# Patient Record
Sex: FEMALE | Race: WHITE | NOT HISPANIC OR LATINO | Employment: UNEMPLOYED | URBAN - NONMETROPOLITAN AREA
[De-identification: names, ages, dates, MRNs, and addresses within clinical notes are randomized per-mention and may not be internally consistent; named-entity substitution may affect disease eponyms.]

---

## 2023-04-06 PROBLEM — R53.1 DECREASED STRENGTH: Status: ACTIVE | Noted: 2023-04-06

## 2023-04-06 PROBLEM — S52.602D CLOSED FRACTURE DISTAL RADIUS AND ULNA, LEFT, WITH ROUTINE HEALING, SUBSEQUENT ENCOUNTER: Status: ACTIVE | Noted: 2023-04-06

## 2023-04-06 PROBLEM — S52.502D CLOSED FRACTURE DISTAL RADIUS AND ULNA, LEFT, WITH ROUTINE HEALING, SUBSEQUENT ENCOUNTER: Status: ACTIVE | Noted: 2023-04-06

## 2023-04-06 PROBLEM — M25.632 DECREASED RANGE OF MOTION OF LEFT WRIST: Status: ACTIVE | Noted: 2023-04-06

## 2023-04-06 RX ORDER — IBUPROFEN 800 MG/1
1 TABLET ORAL 3 TIMES DAILY PRN
COMMUNITY
Start: 2022-07-19 | End: 2023-10-02 | Stop reason: ALTCHOICE

## 2023-04-07 ENCOUNTER — TELEPHONE (OUTPATIENT)
Dept: PRIMARY CARE | Facility: CLINIC | Age: 63
End: 2023-04-07

## 2023-04-07 ENCOUNTER — OFFICE VISIT (OUTPATIENT)
Dept: PRIMARY CARE | Facility: CLINIC | Age: 63
End: 2023-04-07
Payer: COMMERCIAL

## 2023-04-07 VITALS
DIASTOLIC BLOOD PRESSURE: 88 MMHG | WEIGHT: 174.9 LBS | HEIGHT: 64 IN | BODY MASS INDEX: 29.86 KG/M2 | HEART RATE: 79 BPM | OXYGEN SATURATION: 97 % | SYSTOLIC BLOOD PRESSURE: 140 MMHG

## 2023-04-07 DIAGNOSIS — Z13.1 SCREENING FOR DIABETES MELLITUS: ICD-10-CM

## 2023-04-07 DIAGNOSIS — Z12.31 BREAST CANCER SCREENING BY MAMMOGRAM: ICD-10-CM

## 2023-04-07 DIAGNOSIS — Z12.11 SCREENING FOR COLON CANCER: Primary | ICD-10-CM

## 2023-04-07 DIAGNOSIS — L71.9 ROSACEA: ICD-10-CM

## 2023-04-07 DIAGNOSIS — Z13.220 ENCOUNTER FOR SCREENING FOR LIPID DISORDER: ICD-10-CM

## 2023-04-07 PROCEDURE — 99386 PREV VISIT NEW AGE 40-64: CPT | Performed by: FAMILY MEDICINE

## 2023-04-07 PROCEDURE — 1036F TOBACCO NON-USER: CPT | Performed by: FAMILY MEDICINE

## 2023-04-07 ASSESSMENT — ENCOUNTER SYMPTOMS
VOMITING: 0
PALPITATIONS: 0
FATIGUE: 0
ARTHRALGIAS: 0
COUGH: 0
WHEEZING: 0
TROUBLE SWALLOWING: 0
CONSTIPATION: 0
RHINORRHEA: 0
NUMBNESS: 0
UNEXPECTED WEIGHT CHANGE: 0
DIARRHEA: 0
ADENOPATHY: 0
ABDOMINAL DISTENTION: 0
ABDOMINAL PAIN: 0
SHORTNESS OF BREATH: 0
NAUSEA: 0
ACTIVITY CHANGE: 0
APPETITE CHANGE: 0
HEADACHES: 0
LIGHT-HEADEDNESS: 0
DIFFICULTY URINATING: 0
DIZZINESS: 0

## 2023-04-07 NOTE — PROGRESS NOTES
"Subjective   Patient ID: aCt Saunders is a 62 y.o. female who presents for NP, ESTAB CARE.    HPI   No MMG in years, no pap in years, no colon cancer screening in the past (sister and grandfather with CA)  No bowel issues, had fracture last year the left wrist after fall  Tries to stay active, no tobacco use  No headache, chest pain, shortness of breath, dizziness, lightheadedness, or edema  No skin changes, no N/T, some weakness in L hand at times    Review of Systems   Constitutional:  Negative for activity change, appetite change, fatigue and unexpected weight change.   HENT:  Negative for ear pain, nosebleeds, rhinorrhea, sneezing and trouble swallowing.    Respiratory:  Negative for cough, shortness of breath and wheezing.    Cardiovascular:  Negative for chest pain, palpitations and leg swelling.   Gastrointestinal:  Negative for abdominal distention, abdominal pain, constipation, diarrhea, nausea and vomiting.   Genitourinary:  Negative for difficulty urinating.   Musculoskeletal:  Negative for arthralgias.   Skin:  Negative for rash.   Neurological:  Negative for dizziness, light-headedness, numbness and headaches.   Hematological:  Negative for adenopathy.   Psychiatric/Behavioral:  Negative for behavioral problems.    All other systems reviewed and are negative.      Objective   /88   Pulse 79   Ht 1.613 m (5' 3.5\")   Wt 79.3 kg (174 lb 14.4 oz)   SpO2 97%   BMI 30.50 kg/m²     Physical Exam  Vitals and nursing note reviewed.   Constitutional:       General: She is not in acute distress.     Appearance: Normal appearance. She is not toxic-appearing.   HENT:      Head: Normocephalic and atraumatic.      Right Ear: Tympanic membrane, ear canal and external ear normal.      Left Ear: Tympanic membrane, ear canal and external ear normal.      Nose: Nose normal.      Mouth/Throat:      Mouth: Mucous membranes are dry.      Pharynx: Oropharynx is clear.   Eyes:      Extraocular Movements: Extraocular " movements intact.      Conjunctiva/sclera: Conjunctivae normal.      Pupils: Pupils are equal, round, and reactive to light.   Cardiovascular:      Rate and Rhythm: Normal rate and regular rhythm.   Pulmonary:      Effort: Pulmonary effort is normal.      Breath sounds: Normal breath sounds.   Abdominal:      General: Abdomen is flat. Bowel sounds are normal.      Palpations: Abdomen is soft.   Musculoskeletal:      Cervical back: Normal range of motion and neck supple.   Skin:     General: Skin is warm and dry.      Capillary Refill: Capillary refill takes less than 2 seconds.   Neurological:      General: No focal deficit present.      Mental Status: She is alert and oriented to person, place, and time. Mental status is at baseline.   Psychiatric:         Mood and Affect: Mood normal.         Behavior: Behavior normal.         Assessment/Plan   Problem List Items Addressed This Visit    None  Visit Diagnoses       Screening for colon cancer    -  Primary    Arrange for screening colonoscopy.    Relevant Orders    Colonoscopy    Breast cancer screening by mammogram        Arrange for screening mammogram    Relevant Orders    BI mammo bilateral screening tomosynthesis    Screening for diabetes mellitus        Get blood testing advised about diet.    Relevant Orders    Comprehensive Metabolic Panel    Encounter for screening for lipid disorder        We will get blood testing.    Relevant Orders    Lipid Panel    Rosacea            Patient was advised about recommendations for diet and exercise, get home blood pressure readings with goal to be less than 140/90.  Patient was recommended to get a Pap smear, will schedule for colonoscopy and mammogram, get fasting blood test, was advised about treatment options for rosacea, follow-up as needed.

## 2023-04-07 NOTE — PATIENT INSTRUCTIONS
Follow-up as needed, check home blood pressures, goal to be less than 140/90, be active, limit caffeine, alcohol and salt

## 2023-04-07 NOTE — TELEPHONE ENCOUNTER
OPEN ACCESS COLONOSCOPY REQUEST, ORDER, INSURANCE, AND DEMOGRAPHICS FAXED TO [facility]/DR. RANDOLPH GÓMEZ/ REQUEST TO SCHEDULE PATIENT.  PATIENT STATES HE HAS NEVER HAD A COLONOSCOPY, HOWEVER BOTH SISTER AND GRANDFATHER HAD COLON CANCER.

## 2023-04-11 ENCOUNTER — TELEPHONE (OUTPATIENT)
Dept: PRIMARY CARE | Facility: CLINIC | Age: 63
End: 2023-04-11
Payer: COMMERCIAL

## 2023-04-11 NOTE — TELEPHONE ENCOUNTER
PATIENT CALLED TO REQUEST PAP AND MAMMOGRAM BE DONE AT [facility]. SHE WILL CALL TO SCHEDULE HER APPOINTMENT. SHE WILL HAVE OB/GYN SCHEDULE MAMMOGRAM.

## 2023-09-22 ENCOUNTER — DOCUMENTATION (OUTPATIENT)
Dept: SURGERY | Facility: HOSPITAL | Age: 63
End: 2023-09-22
Payer: COMMERCIAL

## 2023-09-22 ENCOUNTER — PREP FOR PROCEDURE (OUTPATIENT)
Dept: SURGERY | Facility: HOSPITAL | Age: 63
End: 2023-09-22
Payer: COMMERCIAL

## 2023-10-01 RX ORDER — SODIUM CHLORIDE 9 MG/ML
100 INJECTION, SOLUTION INTRAVENOUS CONTINUOUS
Status: DISCONTINUED | OUTPATIENT
Start: 2023-10-02 | End: 2023-10-20 | Stop reason: HOSPADM

## 2023-10-02 ENCOUNTER — HOSPITAL ENCOUNTER (OUTPATIENT)
Dept: GASTROENTEROLOGY | Facility: CLINIC | Age: 63
Setting detail: OUTPATIENT SURGERY
Discharge: HOME | End: 2023-10-02
Payer: COMMERCIAL

## 2023-10-02 VITALS
TEMPERATURE: 97.3 F | SYSTOLIC BLOOD PRESSURE: 143 MMHG | BODY MASS INDEX: 29.81 KG/M2 | DIASTOLIC BLOOD PRESSURE: 88 MMHG | OXYGEN SATURATION: 97 % | RESPIRATION RATE: 16 BRPM | HEIGHT: 64 IN | HEART RATE: 57 BPM | WEIGHT: 174.6 LBS

## 2023-10-02 DIAGNOSIS — Z12.11 ENCOUNTER FOR SCREENING FOR MALIGNANT NEOPLASM OF COLON: Primary | ICD-10-CM

## 2023-10-02 DIAGNOSIS — Z80.0 FAMILY HISTORY OF MALIGNANT NEOPLASM OF DIGESTIVE ORGANS: ICD-10-CM

## 2023-10-02 PROCEDURE — 7100000010 HC PHASE TWO TIME - EACH INCREMENTAL 1 MINUTE

## 2023-10-02 PROCEDURE — 88305 TISSUE EXAM BY PATHOLOGIST: CPT | Performed by: PATHOLOGY

## 2023-10-02 PROCEDURE — 88305 TISSUE EXAM BY PATHOLOGIST: CPT | Mod: TC | Performed by: SURGERY

## 2023-10-02 PROCEDURE — 99153 MOD SED SAME PHYS/QHP EA: CPT | Performed by: SURGERY

## 2023-10-02 PROCEDURE — 2500000004 HC RX 250 GENERAL PHARMACY W/ HCPCS (ALT 636 FOR OP/ED): Mod: JZ | Performed by: SURGERY

## 2023-10-02 PROCEDURE — 45380 COLONOSCOPY AND BIOPSY: CPT | Performed by: SURGERY

## 2023-10-02 PROCEDURE — 88305 TISSUE EXAM BY PATHOLOGIST: CPT | Mod: TC,SUR | Performed by: SURGERY

## 2023-10-02 PROCEDURE — 99152 MOD SED SAME PHYS/QHP 5/>YRS: CPT | Performed by: SURGERY

## 2023-10-02 PROCEDURE — 3700000013 HC SEDATION LEVEL 5+ TIME - EACH ADDITIONAL 15 MINUTES

## 2023-10-02 PROCEDURE — 3700000012 HC SEDATION LEVEL 5+ TIME - INITIAL 15 MINUTES 5/> YEARS

## 2023-10-02 PROCEDURE — 7100000009 HC PHASE TWO TIME - INITIAL BASE CHARGE

## 2023-10-02 RX ORDER — MIDAZOLAM HYDROCHLORIDE 5 MG/ML
INJECTION, SOLUTION INTRAMUSCULAR; INTRAVENOUS AS NEEDED
Status: COMPLETED | OUTPATIENT
Start: 2023-10-02 | End: 2023-10-02

## 2023-10-02 RX ORDER — CYANOCOBALAMIN (VITAMIN B-12) 250 MCG
250 TABLET ORAL DAILY
COMMUNITY

## 2023-10-02 RX ORDER — FENTANYL CITRATE 50 UG/ML
INJECTION, SOLUTION INTRAMUSCULAR; INTRAVENOUS AS NEEDED
Status: COMPLETED | OUTPATIENT
Start: 2023-10-02 | End: 2023-10-02

## 2023-10-02 RX ORDER — CALCIUM CARBONATE 500(1250)
1 TABLET ORAL DAILY
COMMUNITY

## 2023-10-02 RX ORDER — ONDANSETRON HYDROCHLORIDE 2 MG/ML
4 INJECTION, SOLUTION INTRAVENOUS ONCE AS NEEDED
Status: DISCONTINUED | OUTPATIENT
Start: 2023-10-02 | End: 2023-10-20 | Stop reason: HOSPADM

## 2023-10-02 RX ADMIN — MIDAZOLAM HYDROCHLORIDE 4 MG: 5 INJECTION, SOLUTION INTRAMUSCULAR; INTRAVENOUS at 07:55

## 2023-10-02 RX ADMIN — FENTANYL CITRATE 50 MCG: 50 INJECTION, SOLUTION INTRAMUSCULAR; INTRAVENOUS at 07:54

## 2023-10-02 RX ADMIN — SODIUM CHLORIDE 100 ML/HR: 9 INJECTION, SOLUTION INTRAVENOUS at 07:00

## 2023-10-02 RX ADMIN — GLUCAGON HYDROCHLORIDE 1 MG: KIT at 07:54

## 2023-10-02 ASSESSMENT — PAIN - FUNCTIONAL ASSESSMENT
PAIN_FUNCTIONAL_ASSESSMENT: 0-10

## 2023-10-02 ASSESSMENT — PAIN SCALES - GENERAL
PAINLEVEL_OUTOF10: 0 - NO PAIN

## 2023-10-02 ASSESSMENT — COLUMBIA-SUICIDE SEVERITY RATING SCALE - C-SSRS
6. HAVE YOU EVER DONE ANYTHING, STARTED TO DO ANYTHING, OR PREPARED TO DO ANYTHING TO END YOUR LIFE?: NO
1. IN THE PAST MONTH, HAVE YOU WISHED YOU WERE DEAD OR WISHED YOU COULD GO TO SLEEP AND NOT WAKE UP?: NO
2. HAVE YOU ACTUALLY HAD ANY THOUGHTS OF KILLING YOURSELF?: NO

## 2023-10-02 NOTE — H&P
General Surgery H&P    Patient: Cat Saunders  : 1960  MRN: [id number]  Date: 10/02/23    Referring Primary Care Provider: Oscar Lee MD    Chief Complaint: Colon cancer screening    History of Present Illness: Cat Saunders is a 63 y.o. old female seen at the request of Dr. Lee for colon cancer screening.  She has never had a colonoscopy.  Her sister had colon cancer, diagnosed in her early 80s.  She also had a grandparent with colon cancer diagnosed at an advanced age.  She has bowel movements on average once daily.  She has occasionally seen bright red blood with bowel movements when constipated and straining.  She takes stool softeners as needed, but not on a regular basis.  She is not on any blood thinners or antiplatelet agents.    Medical History:  BMI 30, otherwise no known medical issues.    Surgical History:  Surgery for a wrist fracture 2022    Home Medications:  Prior to Admission medications    Medication Sig Start Date End Date Taking? Authorizing Provider   ibuprofen 800 mg tablet Take 1 tablet (800 mg) by mouth 3 times a day as needed. 22   Historical Provider, MD     Allergies:  No Known Allergies    Family History:   Sister and paternal grandmother with history of colon cancer, both diagnosed at advanced age.    Social History:  Non-smoker    ROS:  Constitutional:  no fever, sweats, and chills  Cardiovascular: No chest pain  Respiratory: No cough or shortness of breath  Gastrointestinal: No abdominal pain, + blood in the stool if straining/constipated, no change in bowel habits  Genitourinary: no dysuria  Musculoskeletal: no weakness or swelling  Integumentary: no rashes  Neurological: no confusion  Endocrine: no heat or cold intolerance  Heme/Lymph: no easy bruising or bleeding    Physical Exam:  Constitutional: No acute distress, conversant, pleasant  Neurologic: alert and oriented  Psych: appropriate affect  Ears, Nose, Mouth and Throat: mucus membranes moist  Pulmonary: No  labored breathing  Cardiovascular: Regular rate and rhythm  Abdomen: non-distended, BMI 30  Musculoskeletal: Moves all extremities  Skin: warm and dry, no jaundice    Labs/imaging:  No pertinent labs or imaging available for review    Assessment and Plan: Cat Saunders is a 63 y.o. old female with family history of colon cancer, in need of colon cancer screening.  We discussed risks and benefit of colonoscopy.  This included risk of bleeding, perforation, missed polyps, incomplete colonoscopy, and potential need for additional procedures pending findings.  She was agreeable to proceed.    Priscila Perez MD  10/2/2023

## 2023-10-06 ENCOUNTER — DOCUMENTATION (OUTPATIENT)
Dept: SURGERY | Facility: CLINIC | Age: 63
End: 2023-10-06
Payer: COMMERCIAL

## 2023-10-06 ENCOUNTER — TELEPHONE (OUTPATIENT)
Dept: SURGERY | Facility: HOSPITAL | Age: 63
End: 2023-10-06
Payer: COMMERCIAL

## 2023-10-06 LAB
LABORATORY COMMENT REPORT: NORMAL
PATH REPORT.FINAL DX SPEC: NORMAL
PATH REPORT.GROSS SPEC: NORMAL
PATH REPORT.TOTAL CANCER: NORMAL

## 2023-10-06 NOTE — PROGRESS NOTES
Spoke to patient to see how they were doing after colonoscopy.  Pt denied fever, chills, nausea, and vomiting. Pt needs to repeat colonoscopy in 5 years. Dr. Perez will call patient in 2-3 weeks with pathology.

## 2023-10-06 NOTE — TELEPHONE ENCOUNTER
I called the patient to discuss pathology results from recent colonoscopy.  There was no answer, but voicemail was left with callback number provided.  The polyp was a 4 mm tubular adenoma.  This is benign.  Given family history, recommend repeat colonoscopy in 5 years.    Priscila Perez MD  10/06/23  3:18 PM

## 2024-04-08 ENCOUNTER — APPOINTMENT (OUTPATIENT)
Dept: PRIMARY CARE | Facility: CLINIC | Age: 64
End: 2024-04-08
Payer: COMMERCIAL

## 2024-06-10 ENCOUNTER — APPOINTMENT (OUTPATIENT)
Dept: PRIMARY CARE | Facility: CLINIC | Age: 64
End: 2024-06-10
Payer: COMMERCIAL

## 2024-06-12 ENCOUNTER — APPOINTMENT (OUTPATIENT)
Dept: PRIMARY CARE | Facility: CLINIC | Age: 64
End: 2024-06-12
Payer: COMMERCIAL

## 2025-02-04 ENCOUNTER — HOSPITAL ENCOUNTER (OUTPATIENT)
Dept: RADIOLOGY | Facility: HOSPITAL | Age: 65
Discharge: HOME | End: 2025-02-04
Payer: COMMERCIAL

## 2025-02-04 ENCOUNTER — OFFICE VISIT (OUTPATIENT)
Age: 65
End: 2025-02-04
Payer: COMMERCIAL

## 2025-02-04 VITALS
DIASTOLIC BLOOD PRESSURE: 80 MMHG | SYSTOLIC BLOOD PRESSURE: 140 MMHG | HEART RATE: 68 BPM | OXYGEN SATURATION: 97 % | HEIGHT: 64 IN | BODY MASS INDEX: 28.85 KG/M2 | WEIGHT: 169 LBS

## 2025-02-04 DIAGNOSIS — M25.531 RIGHT WRIST PAIN: Primary | ICD-10-CM

## 2025-02-04 DIAGNOSIS — M25.531 RIGHT WRIST PAIN: ICD-10-CM

## 2025-02-04 PROCEDURE — 73100 X-RAY EXAM OF WRIST: CPT | Mod: RIGHT SIDE | Performed by: RADIOLOGY

## 2025-02-04 PROCEDURE — 73100 X-RAY EXAM OF WRIST: CPT | Mod: RT

## 2025-02-04 PROCEDURE — 99213 OFFICE O/P EST LOW 20 MIN: CPT | Performed by: FAMILY MEDICINE

## 2025-02-04 PROCEDURE — 3008F BODY MASS INDEX DOCD: CPT | Performed by: FAMILY MEDICINE

## 2025-02-04 PROCEDURE — 1036F TOBACCO NON-USER: CPT | Performed by: FAMILY MEDICINE

## 2025-02-04 ASSESSMENT — ENCOUNTER SYMPTOMS
NUMBNESS: 0
APPETITE CHANGE: 0
LOSS OF SENSATION IN FEET: 0
ACTIVITY CHANGE: 0
OCCASIONAL FEELINGS OF UNSTEADINESS: 0
ARTHRALGIAS: 1
WEAKNESS: 0
JOINT SWELLING: 1
DEPRESSION: 0

## 2025-02-04 ASSESSMENT — PATIENT HEALTH QUESTIONNAIRE - PHQ9
SUM OF ALL RESPONSES TO PHQ9 QUESTIONS 1 AND 2: 0
1. LITTLE INTEREST OR PLEASURE IN DOING THINGS: NOT AT ALL
2. FEELING DOWN, DEPRESSED OR HOPELESS: NOT AT ALL

## 2025-02-04 NOTE — PROGRESS NOTES
"Subjective   Patient ID: Cat Saunders is a 64 y.o. female who presents for RT Hand Pain (x1wk).    HPI   Fell 1 week ago, slipped on ice, no N/T, no weakness, pain palmar wrist, using Aleve, no splint, some swelling    Review of Systems   Constitutional:  Negative for activity change and appetite change.   Musculoskeletal:  Positive for arthralgias and joint swelling.   Neurological:  Negative for weakness and numbness.       Objective   /80   Pulse 68   Ht 1.626 m (5' 4\")   Wt 76.7 kg (169 lb)   SpO2 97%   BMI 29.01 kg/m²     Physical Exam  Vitals and nursing note reviewed.   Constitutional:       Appearance: Normal appearance. She is normal weight.   Musculoskeletal:      Comments: Pain and swelling over the palmar aspect of the right wrist.  No tenderness over the anatomical snuffbox, some pain with flexion and extension of the hand, no pain referred with compression of the mid forearm.  Vascularly intact, sensation intact distally.   Neurological:      Mental Status: She is alert.         Assessment/Plan   Problem List Items Addressed This Visit    None  Visit Diagnoses         Codes    Right wrist pain    -  Primary M25.531    Check x-ray, advised about splint, ice, anti-inflammatory.     Relevant Orders    XR wrist right 1-2 views               "

## 2025-02-05 ENCOUNTER — TELEPHONE (OUTPATIENT)
Age: 65
End: 2025-02-05
Payer: COMMERCIAL

## 2025-02-05 DIAGNOSIS — M25.531 RIGHT WRIST PAIN: ICD-10-CM

## 2025-02-05 NOTE — TELEPHONE ENCOUNTER
----- Message from Oscar Lee sent at 2/5/2025  8:03 AM EST -----  Please let the patient know that she does have a fracture in one of the bones in her wrist.  I recommend using the Velcro wrist splint that we suggested yesterday, and I would like her to see orthopedics   
Pt notified and ok with referral to Ortho  
no abrasions, no jaundice, no lesions, no pruritis, and no rashes.

## 2025-02-05 NOTE — RESULT ENCOUNTER NOTE
Please let the patient know that she does have a fracture in one of the bones in her wrist.  I recommend using the Velcro wrist splint that we suggested yesterday, and I would like her to see orthopedics

## 2025-02-12 ENCOUNTER — HOSPITAL ENCOUNTER (OUTPATIENT)
Dept: RADIOLOGY | Facility: EXTERNAL LOCATION | Age: 65
Discharge: HOME | End: 2025-02-12

## 2025-02-12 ENCOUNTER — APPOINTMENT (OUTPATIENT)
Dept: ORTHOPEDIC SURGERY | Facility: CLINIC | Age: 65
End: 2025-02-12
Payer: COMMERCIAL

## 2025-02-12 ENCOUNTER — HOSPITAL ENCOUNTER (OUTPATIENT)
Dept: RADIOLOGY | Facility: CLINIC | Age: 65
Discharge: HOME | End: 2025-02-12
Payer: COMMERCIAL

## 2025-02-12 DIAGNOSIS — M25.531 RIGHT WRIST PAIN: ICD-10-CM

## 2025-02-12 DIAGNOSIS — S62.101A RIGHT WRIST FRACTURE, CLOSED, INITIAL ENCOUNTER: ICD-10-CM

## 2025-02-12 PROCEDURE — 76882 US LMTD JT/FCL EVL NVASC XTR: CPT | Performed by: SPECIALIST

## 2025-02-12 PROCEDURE — 73110 X-RAY EXAM OF WRIST: CPT | Mod: RT

## 2025-02-12 PROCEDURE — 1036F TOBACCO NON-USER: CPT | Performed by: SPECIALIST

## 2025-02-12 PROCEDURE — 99214 OFFICE O/P EST MOD 30 MIN: CPT | Performed by: SPECIALIST

## 2025-02-12 ASSESSMENT — PAIN DESCRIPTION - DESCRIPTORS: DESCRIPTORS: ACHING;SHARP;SHOOTING

## 2025-02-12 ASSESSMENT — PAIN - FUNCTIONAL ASSESSMENT: PAIN_FUNCTIONAL_ASSESSMENT: 0-10

## 2025-02-12 NOTE — PROGRESS NOTES
Assessment/Plan   Encounter Diagnoses:  Right wrist fracture, closed, initial encounter    Right wrist pain  Right wrist fracture, closed, initial encounter  Assessment: Right wrist triquetral fracture with a dorsal avulsion fragment.    Plan:  Continue the cock up wrist splint on a as needed basis.  Gentle range of motion and strengthening of the wrist.  Follow-up in 3 to 4 weeks for reevaluation with new x-rays.  I reviewed the ultrasound in real-time with her.       Subjective    Patient ID: Cat Saunders is a 64 y.o. female.    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  She is having minimal pain.  Initially her pain was dorsal but today she pointed to the Pisa form as the area where most of her pain was.  Has no bruising or significant swelling in the wrist.    OBJECTIVE: ORTHO EXAM  Right wrist exam  Minimal to no swelling over the dorsum of the wrist.  Deep palpation had some minimal soreness in the area of the triquetrum where she may have had a dorsal avulsion fracture.  The Pisa form was mildly tender.  Range of motion of the wrist was otherwise full with full pronation and supination.  She had dorsiflexion past 45 degrees she had palmar flexion past 45 degrees.  She had 90 do 100%  strength.    X-rays obtained today show a dorsal avulsion fragment that could be consistent with capsular avulsion from the triquetrum.    IMAGE RESULTS:  Point of Care Ultrasound  These images are not reportable by radiology and will not be interpreted   by  Radiologists.      ULTRASOUND  Wrist Ultrasound    DIAGNOSTIC ULTRASOUND REPORT FINAL: Right HAND AND WRIST  Sonographer: Emir Nobles MD  Procedure: Ultrasound, extremity, nonvascular, real-time, COMPLETE, anatomic  specific.  Indication: Wrist Pain  Technique: B-Mode Ultrasound Examination performed using 8-13 MHz linear transducer with [employer]  STUDY TYPE:  1. ULTRASOUND EXTREMITY  2. REAL TIME WITH IMAGE DOCUMENTATION  3. NON-VASCULAR  4. COMPLETE STUDY  Site: LEFT HAND AND WRIST  Live ultrasound was performed with the patient's HAND AND WRIST and PERMANENTLY documented. COMPLETE and FINAL ULTRASOUND REPORT of the patient's  HAND AND WRIST. . I personally performed the ultrasound and reviewed the findings. These show:    Live ultrasound was performed of the patient's HAND AND WRIST that shows intact Extensor digitorum communis, Extensor digiti minimi, Extensor indicis proprius, Extensor Pollicus Longus, Flexor Pollicus Longus, Flexor digitorum profundus, Flexor digitorum superficialis tendon with the THENAR and HYPOTHENAR muscle fibers showing normal striations. NO FLUID NOTED WITHIN THE CARPAL TUNNEL, THE MEDIAN NERVE SHOWS NORMAL PATTERN OF ECHOGENICITY. The median n. remains swollen.  The tendons appear normal in appearance and size as they pass the styloid process. No fracture is appreciated. Nonsterile gloves were used for this procedure  gauze pads were then used to clean the area after the procedure was compete. The patient tolerated the procedure well.    Med. nerve is normal in diameter with a minimal echogenic halo.  Dorsally there was some minimal soft tissue swelling.  The Pisa form itself did not have any indication of fracture or surrounding soft tissue swelling.      Procedures     Orders Placed This Encounter    XR wrist right 3+ views    Point of Care Ultrasound    XR wrist right 3+ views

## 2025-02-12 NOTE — ASSESSMENT & PLAN NOTE
Assessment: Right wrist triquetral fracture with a dorsal avulsion fragment.    Plan:  Continue the cock up wrist splint on a as needed basis.  Gentle range of motion and strengthening of the wrist.  Follow-up in 3 to 4 weeks for reevaluation with new x-rays.  I reviewed the ultrasound in real-time with her.

## 2025-03-05 ENCOUNTER — APPOINTMENT (OUTPATIENT)
Dept: ORTHOPEDIC SURGERY | Facility: CLINIC | Age: 65
End: 2025-03-05
Payer: COMMERCIAL

## 2025-03-20 ENCOUNTER — APPOINTMENT (OUTPATIENT)
Dept: ORTHOPEDIC SURGERY | Facility: CLINIC | Age: 65
End: 2025-03-20
Payer: COMMERCIAL

## 2025-03-25 ENCOUNTER — APPOINTMENT (OUTPATIENT)
Dept: ORTHOPEDIC SURGERY | Facility: CLINIC | Age: 65
End: 2025-03-25
Payer: COMMERCIAL

## 2025-03-28 ENCOUNTER — APPOINTMENT (OUTPATIENT)
Dept: ORTHOPEDIC SURGERY | Facility: CLINIC | Age: 65
End: 2025-03-28
Payer: COMMERCIAL

## 2025-04-01 ENCOUNTER — OFFICE VISIT (OUTPATIENT)
Dept: ORTHOPEDIC SURGERY | Facility: CLINIC | Age: 65
End: 2025-04-01
Payer: COMMERCIAL

## 2025-04-01 ENCOUNTER — HOSPITAL ENCOUNTER (OUTPATIENT)
Dept: RADIOLOGY | Facility: CLINIC | Age: 65
Discharge: HOME | End: 2025-04-01
Payer: COMMERCIAL

## 2025-04-01 DIAGNOSIS — S52.502D CLOSED FRACTURE DISTAL RADIUS AND ULNA, LEFT, WITH ROUTINE HEALING, SUBSEQUENT ENCOUNTER: Primary | ICD-10-CM

## 2025-04-01 DIAGNOSIS — M25.531 RIGHT WRIST PAIN: ICD-10-CM

## 2025-04-01 DIAGNOSIS — S52.602D CLOSED FRACTURE DISTAL RADIUS AND ULNA, LEFT, WITH ROUTINE HEALING, SUBSEQUENT ENCOUNTER: Primary | ICD-10-CM

## 2025-04-01 DIAGNOSIS — S62.101A RIGHT WRIST FRACTURE, CLOSED, INITIAL ENCOUNTER: ICD-10-CM

## 2025-04-01 PROCEDURE — 99213 OFFICE O/P EST LOW 20 MIN: CPT | Performed by: SPECIALIST

## 2025-04-01 PROCEDURE — 73110 X-RAY EXAM OF WRIST: CPT | Mod: RT

## 2025-04-01 PROCEDURE — 73110 X-RAY EXAM OF WRIST: CPT | Mod: RIGHT SIDE | Performed by: RADIOLOGY

## 2025-04-01 PROCEDURE — 1036F TOBACCO NON-USER: CPT | Performed by: SPECIALIST

## 2025-04-01 ASSESSMENT — PAIN - FUNCTIONAL ASSESSMENT: PAIN_FUNCTIONAL_ASSESSMENT: NO/DENIES PAIN

## 2025-04-02 ENCOUNTER — APPOINTMENT (OUTPATIENT)
Dept: ORTHOPEDIC SURGERY | Facility: CLINIC | Age: 65
End: 2025-04-02
Payer: COMMERCIAL

## 2025-04-02 NOTE — ASSESSMENT & PLAN NOTE
Assessment: Dorsal triquetral avulsion type fracture now 2 months status post injury.    Plan:  Follow-up on a as needed basis.

## 2025-04-02 NOTE — PROGRESS NOTES
Assessment/Plan   Encounter Diagnoses:  No diagnosis found.  Closed fracture distal radius and ulna, left, with routine healing, subsequent encounter  Assessment: Dorsal triquetral avulsion type fracture now 2 months status post injury.    Plan:  Follow-up on a as needed basis.       Subjective    Patient ID: Cat Saunders is a 64 y.o. female.    Chief Complaint: Pain and New Patient Visit of the Right Wrist (RIGHT WRIST FRACTURE/4-1-25 X-RAYS )     Last Surgery: No surgery found  Last Surgery Date: No surgery found    HPI  64-year-old who fell and sustained a dorsal triquetral avulsion fracture.  She comes in today stating she is returned to her activities of daily living without any pain and no disability.    OBJECTIVE: ORTHO EXAM  Right wrist exam  She demonstrates full range of motion of her wrist.  She is nontender to palpation especially dorsally over the carpus.  She demonstrates full pronation and supination.  She is neurovascularly intact distally.  She has excellent  strength 5/5.  She has normal sensation.    X-ray shows remodeling of the avulsion fracture with some resorption of the fragment.    IMAGE RESULTS:  XR wrist right 3+ views  Narrative: Interpreted By:  Melissa Julian,   STUDY:  Right wrist,  3 views.      INDICATION:  Signs/Symptoms:RIGHT WRIST FRACTURE.      COMPARISON:  02/04/2025.      ACCESSION NUMBER(S):  [id number]      ORDERING CLINICIAN:  ERASMO SOLORZANO      FINDINGS:  Healing nondisplaced dorsal triquetral avulsion type fracture noted  with increased osseous bridging. No new fracture.      No significant degenerative changes.      Soft tissues are within normal limits.      Impression: 1. As above.      MACRO:  None.      Signed by: Melissa Julian 2/13/2025 9:59 PM  Dictation workstation:   [id number]      ULTRASOUND      Procedures     No orders of the defined types were placed in this encounter.